# Patient Record
Sex: MALE | Race: WHITE | NOT HISPANIC OR LATINO | Employment: FULL TIME | URBAN - METROPOLITAN AREA
[De-identification: names, ages, dates, MRNs, and addresses within clinical notes are randomized per-mention and may not be internally consistent; named-entity substitution may affect disease eponyms.]

---

## 2022-08-01 ENCOUNTER — TRANSFERRED RECORDS (OUTPATIENT)
Dept: MULTI SPECIALTY CLINIC | Facility: CLINIC | Age: 62
End: 2022-08-01

## 2022-08-01 LAB
CHOLESTEROL (EXTERNAL): 216 MG/DL (ref 100–199)
HDLC SERPL-MCNC: 29 MG/DL
LDL CHOLESTEROL CALCULATED (EXTERNAL): 154 MG/DL
NON HDL CHOLESTEROL (EXTERNAL): 187 MG/DL
TRIGLYCERIDES (EXTERNAL): 167 MG/DL

## 2023-05-16 ENCOUNTER — OFFICE VISIT (OUTPATIENT)
Dept: FAMILY MEDICINE | Facility: OTHER | Age: 63
End: 2023-05-16
Attending: NURSE PRACTITIONER
Payer: COMMERCIAL

## 2023-05-16 VITALS
HEART RATE: 90 BPM | SYSTOLIC BLOOD PRESSURE: 136 MMHG | BODY MASS INDEX: 42.45 KG/M2 | DIASTOLIC BLOOD PRESSURE: 84 MMHG | TEMPERATURE: 98.9 F | OXYGEN SATURATION: 94 % | RESPIRATION RATE: 18 BRPM | WEIGHT: 280.1 LBS | HEIGHT: 68 IN

## 2023-05-16 DIAGNOSIS — J20.9 ACUTE BRONCHITIS, UNSPECIFIED ORGANISM: ICD-10-CM

## 2023-05-16 DIAGNOSIS — R05.8 PRODUCTIVE COUGH: Primary | ICD-10-CM

## 2023-05-16 PROCEDURE — 99203 OFFICE O/P NEW LOW 30 MIN: CPT | Performed by: NURSE PRACTITIONER

## 2023-05-16 RX ORDER — EPINEPHRINE 0.3 MG/.3ML
1 INJECTION SUBCUTANEOUS
COMMUNITY
Start: 2022-07-18

## 2023-05-16 ASSESSMENT — PAIN SCALES - GENERAL: PAINLEVEL: NO PAIN (0)

## 2023-05-16 NOTE — NURSING NOTE
"Chief Complaint   Patient presents with     Cough     Ongoing since last Friday 5/12/23, milky green mucous      chest congestion     Nasal Congestion         Initial /84 (BP Location: Left arm, Patient Position: Sitting, Cuff Size: Adult Regular)   Pulse 90   Temp 98.9  F (37.2  C) (Tympanic)   Resp 18   Ht 1.727 m (5' 8\")   Wt 127.1 kg (280 lb 1.6 oz)   SpO2 94%   BMI 42.59 kg/m   Estimated body mass index is 42.59 kg/m  as calculated from the following:    Height as of this encounter: 1.727 m (5' 8\").    Weight as of this encounter: 127.1 kg (280 lb 1.6 oz).       Medication Reconciliation: Complete    Eden Cohen LPN .......  5/16/2023  2:13 PM   "

## 2023-05-16 NOTE — PROGRESS NOTES
ASSESSMENT/PLAN:    I have reviewed the nursing notes.  I have reviewed the findings, diagnosis, plan and need for follow up with the patient.    1. Productive cough  2. Acute bronchitis, unspecified organism  Provided reassurance to been that his exam and history are consistent with acute bronchitis which is generally caused by viral infections and does not require antibiotic treatment today.  He is receptive to this.  I have very low concern for pneumonia and patient would like to forego chest x-ray at this time but is agreeable to returning for further evaluation if no signs of improvement or worsening condition over the next week.  I recommend he use over-the-counter Mucinex twice daily and increase water intake to help thin secretions.  Offered cough suppressing medicine but patient declines as he is sleeping okay despite nocturnal cough.  - Symptomatic treatment - Encouraged fluids, salt water gargles, honey (only if greater than 1 year in age due to risk of botulism), elevation, humidifier, sinus rinse/netti pot, lozenges, tea, topical vapor rub, popsicles, rest, etc   -May use over-the-counter Tylenol or ibuprofen PRN    Discussed warning signs/symptoms indicative of need to f/u    Follow up if symptoms persist or worsen or concerns    I explained my diagnostic considerations and recommendations to the patient, who voiced understanding and agreement with the treatment plan. All questions were answered. We discussed potential side effects of any prescribed or recommended therapies, as well as expectations for response to treatments.    Yasmin Amador NP  5/16/2023  2:29 PM    HPI:  Cruzito Jacobo is a 62 year old male  who presents to [facility] today for concerns of cough ongoing since last Friday 5/12/2023 with milky green mucous and chest and nasal congestion.  Started about 5 days ago and shows no signs of improvement but is not progressing to worse either.  He has not had any fevers.  Very slight  "shortness of breath with activity but no significant dyspnea.  His wife now is starting to get similar symptoms.  He has had 3 negative COVID test since onset of symptoms.  History of pneumonia 1 time many years ago and he does not feel similarly to how he did then to his recollection.  Does not have any sinus pain or congestion.  Non-smoker.  [place] usually gets primary care physicals in the [facility].  No known diabetes or cardiac history diagnosis.  He is coughing much more at night but it is not interrupting his ability to sleep.    ROS otherwise negative.     History reviewed. No pertinent past medical history.  History reviewed. No pertinent surgical history.  Social History     Tobacco Use     Smoking status: Former     Types: Cigarettes     Quit date: 2003     Years since quittin.0     Smokeless tobacco: Never   Vaping Use     Vaping status: Never Used   Substance Use Topics     Alcohol use: Not Currently     Current Outpatient Medications   Medication Sig Dispense Refill     EPINEPHrine (ANY BX GENERIC EQUIV) 0.3 MG/0.3ML injection 2-pack Inject 1 Pen into the muscle       Allergies   Allergen Reactions     Bee Venom Swelling     Past medical history, past surgical history, current medications and allergies reviewed and accurate to the best of my knowledge.      ROS:  Refer to HPI    /84 (BP Location: Left arm, Patient Position: Sitting, Cuff Size: Adult Regular)   Pulse 90   Temp 98.9  F (37.2  C) (Tympanic)   Resp 18   Ht 1.727 m (5' 8\")   Wt 127.1 kg (280 lb 1.6 oz)   SpO2 94%   BMI 42.59 kg/m      EXAM:  General Appearance: Well appearing 62 year old male, appropriate appearance for age. No acute distress   Ears: Left TM intact, translucent with bony landmarks appreciated, no erythema, no effusion, no bulging, no purulence.  Right TM intact, translucent with bony landmarks appreciated, no erythema, no effusion, no bulging, no " purulence.  Left auditory canal clear.  Right auditory canal clear.  Normal external ears, non tender.  Eyes: conjunctivae normal without erythema or irritation, corneas clear, no drainage or crusting, no eyelid swelling, pupils equal   Oropharynx: moist mucous membranes, posterior pharynx without erythema, tonsils symmetric, no erythema, no exudates or petechiae, no post nasal drip seen, no trismus, voice clear.    Sinuses:  No sinus tenderness upon palpation of the frontal or maxillary sinuses  Nose:  Bilateral nares: no erythema, no edema, no drainage or congestion   Neck: supple without adenopathy  Respiratory: normal chest wall and respirations.  Normal effort.  Clear to auscultation bilaterally, no wheezing, crackles or rhonchi.  No increased work of breathing.  + occasional congested cough appreciated.  Cardiac: RRR with no murmurs  Psychological: normal affect, alert, oriented, and pleasant.

## 2023-05-16 NOTE — PATIENT INSTRUCTIONS
Recommend mucinex and lots of water to help get the secretions out of you bronchials.     If worsening over 1 week, return or further evaluation with potential chest xr.

## 2024-06-15 ENCOUNTER — TELEPHONE (OUTPATIENT)
Dept: FAMILY MEDICINE | Facility: OTHER | Age: 64
End: 2024-06-15

## 2024-06-15 ENCOUNTER — VIRTUAL VISIT (OUTPATIENT)
Dept: FAMILY MEDICINE | Facility: OTHER | Age: 64
End: 2024-06-15
Attending: NURSE PRACTITIONER
Payer: COMMERCIAL

## 2024-06-15 DIAGNOSIS — U07.1 COVID-19 VIRUS INFECTION: Primary | ICD-10-CM

## 2024-06-15 PROCEDURE — 99441 PR PHYSICIAN TELEPHONE EVALUATION 5-10 MIN: CPT | Mod: 93 | Performed by: NURSE PRACTITIONER

## 2024-06-15 NOTE — TELEPHONE ENCOUNTER
Please resend rx of paxlovid to [facility].  Thank you. Patient requested at wife's advisement.    Kimberli Hinkle LPN LPN....................  6/15/2024   1:24 PM

## 2024-06-15 NOTE — PROGRESS NOTES
"Cruzito is a 63 year old who is being evaluated via a billable telephone visit.    What phone number would you like to be contacted at? [phone]  How would you like to obtain your AVS? Mail a copy  Originating Location (pt. Location): Home    Distant Location (provider location):  On-site    1. COVID-19 virus infection    - nirmatrelvir and ritonavir (PAXLOVID) 300 mg/100 mg therapy pack; Take 3 tablets by mouth 2 times daily for 5 days  Dispense: 30 tablet; Refill: 0    Medication interactions: none  Renal function: none available in chart     Start time:  11:20 am  End time: 11:28 am  Total time:  8 minutes     Sonya Sanabria NP on 6/15/2024 at 11:21 AM      Subjective   Cruzito is a 63 year old, presenting for the following health issues:  Covid Concern (antiviral)      HPI   Symptoms started yesterday and hit hard.  Positive home Covid testing this morning.  Exposure to covid from his spouse who just traveled to [facility] (tested positive on 6/12/24).  Hx of Covid once in 2022.    Denies any known chronic medical conditions.        COVID-19 Symptom Review  How many days ago did these symptoms start? 1 day    Are any of the following symptoms significant for you?  New or worsening difficulty breathing? No  Worsening cough? Yes, it's a dry cough.   Fever or chills? Yes, I felt feverish with fever of 101.5 with chills.  Headache: YES  Sore throat: YES  Chest pain: No  Diarrhea: No  Body aches: YES    What treatments has patient tried? Decongestant - oral   Does patient live in a nursing home, group home, or shelter? No  Does patient have a way to get food/medications during quarantined? Yes, I have a friend or family member who can help me.        Objective    Vitals - Patient Reported  Weight (Patient Reported): 122.5 kg (270 lb)  Height (Patient Reported): 167.6 cm (5' 6\")  BMI (Based on Pt Reported Ht/Wt): 43.58  Pain Score: Moderate Pain (4)  Pain Loc: Head        Physical Exam   General: Alert and no distress " //Respiratory: No audible wheeze, cough, or shortness of breath // Psychiatric:  Appropriate affect, tone, and pace of words

## 2024-09-05 PROBLEM — Z91.038 HYMENOPTERA ALLERGY: Status: ACTIVE | Noted: 2024-09-05

## 2024-09-05 PROBLEM — Z00.00 ANNUAL PHYSICAL EXAM: Status: ACTIVE | Noted: 2024-09-05

## 2024-09-05 PROBLEM — E78.49 FAMILIAL HYPERLIPIDEMIA: Status: ACTIVE | Noted: 2024-09-05

## 2024-09-10 ENCOUNTER — OFFICE VISIT (OUTPATIENT)
Dept: INTERNAL MEDICINE | Facility: OTHER | Age: 64
End: 2024-09-10
Attending: INTERNAL MEDICINE
Payer: COMMERCIAL

## 2024-09-10 VITALS
HEIGHT: 67 IN | SYSTOLIC BLOOD PRESSURE: 136 MMHG | TEMPERATURE: 97.4 F | HEART RATE: 68 BPM | DIASTOLIC BLOOD PRESSURE: 88 MMHG | RESPIRATION RATE: 18 BRPM | BODY MASS INDEX: 45.14 KG/M2 | OXYGEN SATURATION: 96 % | WEIGHT: 287.6 LBS

## 2024-09-10 DIAGNOSIS — Z71.85 VACCINE COUNSELING: ICD-10-CM

## 2024-09-10 DIAGNOSIS — E78.49 FAMILIAL HYPERLIPIDEMIA: ICD-10-CM

## 2024-09-10 DIAGNOSIS — Z00.00 ANNUAL PHYSICAL EXAM: Primary | ICD-10-CM

## 2024-09-10 DIAGNOSIS — E55.9 VITAMIN D DEFICIENCY: ICD-10-CM

## 2024-09-10 DIAGNOSIS — E66.01 CLASS 3 SEVERE OBESITY DUE TO EXCESS CALORIES WITH SERIOUS COMORBIDITY AND BODY MASS INDEX (BMI) OF 45.0 TO 49.9 IN ADULT (H): ICD-10-CM

## 2024-09-10 DIAGNOSIS — R06.02 EXERTIONAL SHORTNESS OF BREATH: ICD-10-CM

## 2024-09-10 DIAGNOSIS — E66.813 CLASS 3 SEVERE OBESITY DUE TO EXCESS CALORIES WITH SERIOUS COMORBIDITY AND BODY MASS INDEX (BMI) OF 45.0 TO 49.9 IN ADULT (H): ICD-10-CM

## 2024-09-10 DIAGNOSIS — I10 BENIGN ESSENTIAL HYPERTENSION: ICD-10-CM

## 2024-09-10 DIAGNOSIS — E53.8 VITAMIN B12 DEFICIENCY: ICD-10-CM

## 2024-09-10 DIAGNOSIS — Z91.038 HYMENOPTERA ALLERGY: ICD-10-CM

## 2024-09-10 DIAGNOSIS — Z12.5 ENCOUNTER FOR SCREENING FOR MALIGNANT NEOPLASM OF PROSTATE: ICD-10-CM

## 2024-09-10 LAB
ALBUMIN SERPL BCG-MCNC: 4.4 G/DL (ref 3.5–5.2)
ALP SERPL-CCNC: 76 U/L (ref 40–150)
ALT SERPL W P-5'-P-CCNC: 40 U/L (ref 0–70)
ANION GAP SERPL CALCULATED.3IONS-SCNC: 8 MMOL/L (ref 7–15)
AST SERPL W P-5'-P-CCNC: 32 U/L (ref 0–45)
BASOPHILS # BLD AUTO: 0 10E3/UL (ref 0–0.2)
BASOPHILS NFR BLD AUTO: 0 %
BILIRUB SERPL-MCNC: 0.3 MG/DL
BUN SERPL-MCNC: 14 MG/DL (ref 8–23)
CALCIUM SERPL-MCNC: 9.5 MG/DL (ref 8.8–10.4)
CHLORIDE SERPL-SCNC: 105 MMOL/L (ref 98–107)
CHOLEST SERPL-MCNC: 223 MG/DL
CREAT SERPL-MCNC: 0.96 MG/DL (ref 0.67–1.17)
EGFRCR SERPLBLD CKD-EPI 2021: 89 ML/MIN/1.73M2
EOSINOPHIL # BLD AUTO: 0.2 10E3/UL (ref 0–0.7)
EOSINOPHIL NFR BLD AUTO: 2 %
ERYTHROCYTE [DISTWIDTH] IN BLOOD BY AUTOMATED COUNT: 13.1 % (ref 10–15)
FASTING STATUS PATIENT QL REPORTED: NO
FASTING STATUS PATIENT QL REPORTED: NO
GLUCOSE SERPL-MCNC: 116 MG/DL (ref 70–99)
HCO3 SERPL-SCNC: 28 MMOL/L (ref 22–29)
HCT VFR BLD AUTO: 46.5 % (ref 40–53)
HDLC SERPL-MCNC: 28 MG/DL
HGB BLD-MCNC: 15.3 G/DL (ref 13.3–17.7)
HOLD SPECIMEN: NORMAL
IMM GRANULOCYTES # BLD: 0 10E3/UL
IMM GRANULOCYTES NFR BLD: 0 %
LDLC SERPL CALC-MCNC: 144 MG/DL
LYMPHOCYTES # BLD AUTO: 1.8 10E3/UL (ref 0.8–5.3)
LYMPHOCYTES NFR BLD AUTO: 17 %
MCH RBC QN AUTO: 30.1 PG (ref 26.5–33)
MCHC RBC AUTO-ENTMCNC: 32.9 G/DL (ref 31.5–36.5)
MCV RBC AUTO: 92 FL (ref 78–100)
MONOCYTES # BLD AUTO: 0.8 10E3/UL (ref 0–1.3)
MONOCYTES NFR BLD AUTO: 8 %
NEUTROPHILS # BLD AUTO: 7.5 10E3/UL (ref 1.6–8.3)
NEUTROPHILS NFR BLD AUTO: 73 %
NONHDLC SERPL-MCNC: 195 MG/DL
NRBC # BLD AUTO: 0 10E3/UL
NRBC BLD AUTO-RTO: 0 /100
PLATELET # BLD AUTO: 258 10E3/UL (ref 150–450)
POTASSIUM SERPL-SCNC: 4.5 MMOL/L (ref 3.4–5.3)
PROT SERPL-MCNC: 7.2 G/DL (ref 6.4–8.3)
PSA SERPL DL<=0.01 NG/ML-MCNC: 0.66 NG/ML (ref 0–4.5)
RBC # BLD AUTO: 5.08 10E6/UL (ref 4.4–5.9)
SODIUM SERPL-SCNC: 141 MMOL/L (ref 135–145)
TRIGL SERPL-MCNC: 253 MG/DL
VIT B12 SERPL-MCNC: 518 PG/ML (ref 232–1245)
VIT D+METAB SERPL-MCNC: 24 NG/ML (ref 20–50)
WBC # BLD AUTO: 10.3 10E3/UL (ref 4–11)

## 2024-09-10 PROCEDURE — 36415 COLL VENOUS BLD VENIPUNCTURE: CPT | Mod: ZL | Performed by: INTERNAL MEDICINE

## 2024-09-10 PROCEDURE — 99214 OFFICE O/P EST MOD 30 MIN: CPT | Mod: 25 | Performed by: INTERNAL MEDICINE

## 2024-09-10 PROCEDURE — 82306 VITAMIN D 25 HYDROXY: CPT | Mod: ZL | Performed by: INTERNAL MEDICINE

## 2024-09-10 PROCEDURE — 82040 ASSAY OF SERUM ALBUMIN: CPT | Mod: ZL | Performed by: INTERNAL MEDICINE

## 2024-09-10 PROCEDURE — 80061 LIPID PANEL: CPT | Mod: ZL | Performed by: INTERNAL MEDICINE

## 2024-09-10 PROCEDURE — 82607 VITAMIN B-12: CPT | Mod: ZL | Performed by: INTERNAL MEDICINE

## 2024-09-10 PROCEDURE — 99396 PREV VISIT EST AGE 40-64: CPT | Performed by: INTERNAL MEDICINE

## 2024-09-10 PROCEDURE — G0103 PSA SCREENING: HCPCS | Mod: ZL | Performed by: INTERNAL MEDICINE

## 2024-09-10 PROCEDURE — 85048 AUTOMATED LEUKOCYTE COUNT: CPT | Mod: ZL | Performed by: INTERNAL MEDICINE

## 2024-09-10 RX ORDER — ATORVASTATIN CALCIUM 40 MG/1
40 TABLET, FILM COATED ORAL DAILY
Qty: 90 TABLET | Refills: 4 | Status: SHIPPED | OUTPATIENT
Start: 2024-09-10

## 2024-09-10 ASSESSMENT — ENCOUNTER SYMPTOMS
DIARRHEA: 0
CONFUSION: 0
ARTHRALGIAS: 0
VOMITING: 0
DYSURIA: 0
WOUND: 0
AGITATION: 0
WHEEZING: 0
HEMATURIA: 0
CHILLS: 0
COUGH: 0
LIGHT-HEADEDNESS: 0
MYALGIAS: 0
FEVER: 0
SHORTNESS OF BREATH: 0
DIZZINESS: 0
BRUISES/BLEEDS EASILY: 0
ABDOMINAL PAIN: 0
NAUSEA: 0

## 2024-09-10 ASSESSMENT — PAIN SCALES - GENERAL: PAINLEVEL: NO PAIN (0)

## 2024-09-10 NOTE — PATIENT INSTRUCTIONS
Blood pressure was high. Recheck better.     Labs today.       Blood pressure checks at home - check some in AM, some in Afternoon, some in Evening and record   -- bring these with you to your next appointment.     Goal blood pressures -- less than 140 and less than 90.    -- Ideally would like the numbers about 110-130 and 70-80.  -- If running higher or lower than this on regular basis, will need to adjust your medications.        Start Lipitor for cholesterol.     Immunization History   Administered Date(s) Administered    COVID-19 12+ (MODERNA) 10/30/2023    COVID-19 Bivalent 12+ (Pfizer) 11/11/2022    COVID-19 MONOVALENT 12+ (Pfizer) 03/12/2021, 04/02/2021, 12/05/2021    Influenza Vaccine >6 months,quad, PF 10/14/2020    Influenza Vaccine, 6+MO IM (QUADRIVALENT W/PRESERVATIVES) 10/16/2017    Influenza,INJ,MDCK,PF,Quad >6mo(Flucelvax) 12/05/2021, 11/11/2022, 10/30/2023    TDAP (Adacel,Boostrix) 06/17/2015      Consider:  -- Annual Flu / Influenza vaccination - Every Fall (Starting about October 1st)    Consider:  -- COVID-19 Vaccination shot -- TWICE Yearly (Spring and Fall)    -- Tetanus shot updated - from one of the local pharmacies, at your convenience -- Check cost/coverage.     -- Shingles shot (2 in series) (Shingrix) - from one of the local pharmacies, at your convenience -- Check cost/coverage.     One Dose:  -- RSV vaccine for age 60+ with medical issues, and one time for all individuals age 75+ -- In the Fall (Starting about October 1st)  (If Medicare insurance - get vaccine at the Pharmacy.     Private insurance may be able to get in clinic with clinic shot nurse.)  --- Check Cost $$$        Return in approximately 1 year, or sooner as needed for follow-up with Dr. Anne.  - Annual Follow-up / Physical     Clinic : [phone]  Appointment line: [phone]         Your Body mass index (BMI) is:  Estimated body mass index is 45.04 kg/m  as calculated from the following:    Height as of  "this encounter: 1.702 m (5' 7\").    Weight as of this encounter: 130.5 kg (287 lb 9.6 oz).   (BMI ranges: Normal 18.5 - 25, Overweight 25 - 30, Obesity greater than 30)     Facts about losing weight:   -- Overweight and Obesity increase your risk for developing diabetes, high blood pressure and stroke, and shorten your life.   -- 90% of weight loss comes from dietary changes, only 10% from exercise   -- For every 1 pound of of weight loss -- this is equal to about 8 to 10 pounds of weight off of your knees.   -- there are about 3500 calories in 1 pound of body weight.     -- Goal Caloric intake -- 1500 to 1700 calories daily.     Get out and exercise, bike ride, walk for 10 to 15 minutes after each meal -- this can significantly lowers the spike in blood sugar after eating.     To help with weight loss and improve blood sugar control....    -- Try to avoid Carbohydrates as much as possible -- breads, pasta, baked goods, cakes, oatmeal, cold cereal, potatoes.   -- Eat more lean meats, proteins, eggs, nuts, vegetables.    -- Start or Continue regular daily exercise.      -- Eat more lean meats, proteins, eggs, nuts, vegetables.      What have successful people done to lose large amounts of weight, and keep it off?   -- Used both diet and exercise to lose weight   -- Ate a healthy breakfast every day   -- Exercised an hour per day   -- Watched less than 10 hours of TV per week   -- Weighed themselves weekly   -- Drink a large glass of water 10-15 minutes before your meals.   -- Use smaller dinner plates and don't go back for seconds.     What should I do?   -- Work on 5-10% weight loss   -- Focus on a few healthy dietary changes   -- Eat more fresh fruits and vegetables, and fewer carbohydrates (http://myplate.gov/)   -- Exercise by some means -- every day   -- Weigh yourself once a week    Weight Management   Weight Watchers   Meets Mondays 9:30, 11:45, or 5:30  Sabino Romo, [address], [place], [place] "   Contact Alva [phone] [email or web address]  Weight Watchers [email or web address]    -- Consider My Fitness Pal on your smart phone  [email or web address]

## 2024-09-10 NOTE — LETTER
September 10, 2024      Cruzito Jacobo  [address]  [place] [address]        Dear ,    We are writing to inform you of your test results.    Vitamin B12 levels are on the low end of normal.  Consider oral replacement.  TSH is normal.  CBC normal.  Cholesterols are high and anticoagulated glucose is elevated.  Liver enzymes are normal.  Creatinine 0.96 with GFR of 89.    Resulted Orders   Comprehensive metabolic panel   Result Value Ref Range    Sodium 141 135 - 145 mmol/L    Potassium 4.5 3.4 - 5.3 mmol/L    Carbon Dioxide (CO2) 28 22 - 29 mmol/L    Anion Gap 8 7 - 15 mmol/L    Urea Nitrogen 14.0 8.0 - 23.0 mg/dL    Creatinine 0.96 0.67 - 1.17 mg/dL    GFR Estimate 89 >60 mL/min/1.73m2      Comment:      eGFR calculated using 2021 CKD-EPI equation.    Calcium 9.5 8.8 - 10.4 mg/dL      Comment:      Reference intervals for this test were updated on 7/16/2024 to reflect our healthy population more accurately. There may be differences in the flagging of prior results with similar values performed with this method. Those prior results can be interpreted in the context of the updated reference intervals.    Chloride 105 98 - 107 mmol/L    Glucose 116 (H) 70 - 99 mg/dL    Alkaline Phosphatase 76 40 - 150 U/L    AST 32 0 - 45 U/L    ALT 40 0 - 70 U/L    Protein Total 7.2 6.4 - 8.3 g/dL    Albumin 4.4 3.5 - 5.2 g/dL    Bilirubin Total 0.3 <=1.2 mg/dL    Patient Fasting > 8hrs? No    Lipid Profile   Result Value Ref Range    Cholesterol 223 (H) <200 mg/dL    Triglycerides 253 (H) <150 mg/dL    Direct Measure HDL 28 (L) >=40 mg/dL    LDL Cholesterol Calculated 144 (H) <100 mg/dL    Non HDL Cholesterol 195 (H) <130 mg/dL    Patient Fasting > 8hrs? No     Narrative    Cholesterol  Desirable: < 200 mg/dL  Borderline High: 200 - 239 mg/dL  High: >= 240 mg/dL    Triglycerides  Normal: < 150 mg/dL  Borderline High: 150 - 199 mg/dL  High: 200-499 mg/dL  Very High: >= 500 mg/dL    Direct Measure HDL  Female: >=  50 mg/dL   Male: >= 40 mg/dL    LDL Cholesterol  Desirable: < 100 mg/dL  Above Desirable: 100 - 129 mg/dL   Borderline High: 130 - 159 mg/dL   High:  160 - 189 mg/dL   Very High: >= 190 mg/dL    Non HDL Cholesterol  Desirable: < 130 mg/dL  Above Desirable: 130 - 159 mg/dL  Borderline High: 160 - 189 mg/dL  High: 190 - 219 mg/dL  Very High: >= 220 mg/dL   Prostate Specific Antigen Screen   Result Value Ref Range    Prostate Specific Antigen Screen 0.66 0.00 - 4.50 ng/mL    Narrative    This result is obtained using the Roche Elecsys total PSA method on the jeffrey [id number] immunoassay analyzer. Results obtained with different assay methods or kits cannot be used interchangeably.   Vitamin B12   Result Value Ref Range    Vitamin B12 518 232 - 1,245 pg/mL   CBC with platelets and differential   Result Value Ref Range    WBC Count 10.3 4.0 - 11.0 10e3/uL    RBC Count 5.08 4.40 - 5.90 10e6/uL    Hemoglobin 15.3 13.3 - 17.7 g/dL    Hematocrit 46.5 40.0 - 53.0 %    MCV 92 78 - 100 fL    MCH 30.1 26.5 - 33.0 pg    MCHC 32.9 31.5 - 36.5 g/dL    RDW 13.1 10.0 - 15.0 %    Platelet Count 258 150 - 450 10e3/uL    % Neutrophils 73 %    % Lymphocytes 17 %    % Monocytes 8 %    % Eosinophils 2 %    % Basophils 0 %    % Immature Granulocytes 0 %    NRBCs per 100 WBC 0 <1 /100    Absolute Neutrophils 7.5 1.6 - 8.3 10e3/uL    Absolute Lymphocytes 1.8 0.8 - 5.3 10e3/uL    Absolute Monocytes 0.8 0.0 - 1.3 10e3/uL    Absolute Eosinophils 0.2 0.0 - 0.7 10e3/uL    Absolute Basophils 0.0 0.0 - 0.2 10e3/uL    Absolute Immature Granulocytes 0.0 <=0.4 10e3/uL    Absolute NRBCs 0.0 10e3/uL       If you have any questions or concerns, please call the clinic at the number listed above.       Sincerely,      Chiki Anne MD

## 2024-09-10 NOTE — NURSING NOTE
"Chief Complaint   Patient presents with    Physical       Initial BP (!) 162/104   Pulse 68   Temp 97.4  F (36.3  C)   Resp 18   Ht 1.702 m (5' 7\")   Wt 130.5 kg (287 lb 9.6 oz)   SpO2 96%   BMI 45.04 kg/m   Estimated body mass index is 45.04 kg/m  as calculated from the following:    Height as of this encounter: 1.702 m (5' 7\").    Weight as of this encounter: 130.5 kg (287 lb 9.6 oz).  Medication Review: complete    The next two questions are to help us understand your food security.  If you are feeling you need any assistance in this area, we have resources available to support you today.          9/10/2024   SDOH- Food Insecurity   Within the past 12 months, did you worry that your food would run out before you got money to buy more? N   Within the past 12 months, did the food you bought just not last and you didn t have money to get more? N            Health Care Directive:  Patient does not have a Health Care Directive or Living Will: Discussed advance care planning with patient; however, patient declined at this time.    Sharla Batres LPN      "

## 2024-10-25 ENCOUNTER — OFFICE VISIT (OUTPATIENT)
Dept: FAMILY MEDICINE | Facility: OTHER | Age: 64
End: 2024-10-25
Attending: NURSE PRACTITIONER
Payer: COMMERCIAL

## 2024-10-25 VITALS
TEMPERATURE: 98.7 F | RESPIRATION RATE: 19 BRPM | HEIGHT: 67 IN | OXYGEN SATURATION: 97 % | BODY MASS INDEX: 45.04 KG/M2 | HEART RATE: 72 BPM | SYSTOLIC BLOOD PRESSURE: 132 MMHG | DIASTOLIC BLOOD PRESSURE: 74 MMHG | WEIGHT: 287 LBS
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
## 2024-10-25 DIAGNOSIS — S50.861A TICK BITE OF RIGHT FOREARM, INITIAL ENCOUNTER: Primary | ICD-10-CM

## 2024-10-25 PROCEDURE — 99213 OFFICE O/P EST LOW 20 MIN: CPT | Performed by: NURSE PRACTITIONER

## 2024-10-25 RX ORDER — DOXYCYCLINE 100 MG/1
200 CAPSULE ORAL ONCE
Qty: 2 CAPSULE | Refills: 0 | Status: SHIPPED | OUTPATIENT
Start: 2024-10-25 | End: 2024-10-25

## 2024-10-25 ASSESSMENT — PAIN SCALES - GENERAL: PAINLEVEL_OUTOF10: NO PAIN (0)

## 2024-10-25 NOTE — NURSING NOTE
"Chief Complaint   Patient presents with    Tick Bite     Right forearm     Patient here for deer tick bite of right forearm x2 days. Notes redness and mild tenderness.       Initial /74 (BP Location: Left arm, Patient Position: Sitting, Cuff Size: Adult Regular)   Pulse 72   Temp 98.7  F (37.1  C) (Tympanic)   Resp 19   Ht 1.702 m (5' 7\")   Wt 130.2 kg (287 lb)   SpO2 97%   BMI 44.95 kg/m   Estimated body mass index is 44.95 kg/m  as calculated from the following:    Height as of this encounter: 1.702 m (5' 7\").    Weight as of this encounter: 130.2 kg (287 lb).  Medication Review: complete    The next two questions are to help us understand your food security.  If you are feeling you need any assistance in this area, we have resources available to support you today.          10/25/2024   SDOH- Food Insecurity   Within the past 12 months, did you worry that your food would run out before you got money to buy more? N   Within the past 12 months, did the food you bought just not last and you didn t have money to get more? N              Health Care Directive:  Patient does not have a Health Care Directive: Discussed advance care planning with patient; however, patient declined at this time.    Oralia Underwood LPN      "

## 2024-10-25 NOTE — PROGRESS NOTES
ASSESSMENT/PLAN:     I have reviewed the nursing notes.  I have reviewed the findings, diagnosis, plan and need for follow up with the patient.          1. Tick bite of right forearm, initial encounter (Primary)  - doxycycline hyclate (VIBRAMYCIN) 100 MG capsule; Take 2 capsules (200 mg) by mouth once for 1 dose.  Dispense: 2 capsule; Refill: 0    Doxycycline 200 mg one time for prophylactic dosing  No lab work needed today as it is too soon for accurate results   Instructed to wash tick bite with soap and water. Apply antibiotic ointment to site 1-2 times daily until healed.  Tick education discussed including signs/symptoms of tick illness  Follow up if development of any of these symptoms for further evaluation.      I explained my diagnostic considerations and recommendations to the patient, who voiced understanding and agreement with the treatment plan. All questions were answered. We discussed potential side effects of any prescribed or recommended therapies, as well as expectations for response to treatments.    Sonya Sanabria NP  [facility]      SUBJECTIVE:   Cruzito Jacobo is a 64 year old male who presents to clinic today for the following health issues:  Tick bite    HPI  Patient found an attached deer tick on his right forearm 2 days ago.  He estimates the tick to be attached for about a day.  He notes some redness and mild tenderness to the bite area.  Denies fevers, chills, joint pain, headaches, fatigue.  No known hx of tick illness.         History reviewed. No pertinent past medical history.  History reviewed. No pertinent surgical history.  Social History     Tobacco Use    Smoking status: Former     Current packs/day: 0.00     Types: Cigarettes     Quit date: 2003     Years since quittin.5    Smokeless tobacco: Never   Substance Use Topics    Alcohol use: Not Currently     Current Outpatient Medications   Medication Sig Dispense Refill    atorvastatin (LIPITOR) 40  "MG tablet Take 1 tablet (40 mg) by mouth daily. -- for cholesterol 90 tablet 4    EPINEPHrine (ANY BX GENERIC EQUIV) 0.3 MG/0.3ML injection 2-pack Inject 1 Pen into the muscle       Allergies   Allergen Reactions    Bee Venom Swelling         Past medical history, past surgical history, current medications and allergies reviewed and accurate to the best of my knowledge.        OBJECTIVE:     /74 (BP Location: Left arm, Patient Position: Sitting, Cuff Size: Adult Regular)   Pulse 72   Temp 98.7  F (37.1  C) (Tympanic)   Resp 19   Ht 1.702 m (5' 7\")   Wt 130.2 kg (287 lb)   SpO2 97%   BMI 44.95 kg/m    Body mass index is 44.95 kg/m .        Physical Exam  General Appearance: Well appearing adult male, appropriate appearance for age. No acute distress  Respiratory: normal chest wall and respirations.  Normal effort.  No cough appreciated.  Musculoskeletal:  Equal movement of bilateral upper extremities.  Equal movement of bilateral lower extremities.  Normal gait.    Dermatological: Right mid forearm with single small circular skin lesion identified and consistent with tick bite, no surrounding erythema  Psychological: normal affect, alert, oriented, and pleasant.         "

## 2025-01-28 ENCOUNTER — MYC MEDICAL ADVICE (OUTPATIENT)
Dept: INTERNAL MEDICINE | Facility: OTHER | Age: 65
End: 2025-01-28
Payer: COMMERCIAL

## 2025-01-28 DIAGNOSIS — M65.30 TRIGGER FINGER, ACQUIRED: Primary | ICD-10-CM

## 2025-01-28 NOTE — TELEPHONE ENCOUNTER
Message below came in as CRM Request:    ----- Message -----       From:Cruzito Jacobo       Sent:1/25/2025 11:57 AM CST         To:Customer Service    Subject:Referral     Topic: Non-Medical Question.    I think I think I need to see orthopedist for a case of trigger finger. Do I need  referral from my primary care physician?    Edward Linares RN on 1/28/2025 at 10:16 AM

## 2025-01-28 NOTE — TELEPHONE ENCOUNTER
"Pt requesting referral for \"Trigger Finger\" to see Ortho.     Nam'd up referral.     Routing to provider to review and respond.  Edward Linares RN on 1/28/2025 at 10:17 AM    "

## 2025-02-17 ENCOUNTER — OFFICE VISIT (OUTPATIENT)
Dept: FAMILY MEDICINE | Facility: OTHER | Age: 65
End: 2025-02-17
Attending: INTERNAL MEDICINE
Payer: COMMERCIAL

## 2025-02-17 VITALS
DIASTOLIC BLOOD PRESSURE: 80 MMHG | TEMPERATURE: 97.3 F | OXYGEN SATURATION: 97 % | BODY MASS INDEX: 43.95 KG/M2 | HEART RATE: 82 BPM | HEIGHT: 67 IN | WEIGHT: 280 LBS | SYSTOLIC BLOOD PRESSURE: 138 MMHG | RESPIRATION RATE: 18 BRPM

## 2025-02-17 DIAGNOSIS — M65.342 ACQUIRED TRIGGER FINGER OF LEFT RING FINGER: Primary | ICD-10-CM

## 2025-02-17 ASSESSMENT — PAIN SCALES - GENERAL: PAINLEVEL_OUTOF10: MILD PAIN (3)

## 2025-02-17 NOTE — NURSING NOTE
"Chief Complaint   Patient presents with    Finger     Trigger finger left hand ring finger       Initial /80   Pulse 82   Temp 97.3  F (36.3  C) (Temporal)   Resp 18   Ht 1.708 m (5' 7.25\")   Wt 127 kg (280 lb)   SpO2 97%   BMI 43.53 kg/m   Estimated body mass index is 43.53 kg/m  as calculated from the following:    Height as of this encounter: 1.708 m (5' 7.25\").    Weight as of this encounter: 127 kg (280 lb).  Medication Review: complete    The next two questions are to help us understand your food security.  If you are feeling you need any assistance in this area, we have resources available to support you today.          10/25/2024   SDOH- Food Insecurity   Within the past 12 months, did you worry that your food would run out before you got money to buy more? N   Within the past 12 months, did the food you bought just not last and you didn t have money to get more? N         Health Care Directive:  Patient does not have a Health Care Directive: Discussed advance care planning with patient; however, patient declined at this time.    Marina Hodges LPN      "

## 2025-02-17 NOTE — PROGRESS NOTES
"Sports Medicine Office Note    HPI:  64-year-old male coming in for evaluation of left fourth finger triggering.  His symptoms have been going on for approximately 1 month.  No inciting event or injury.  He feels that he had similar symptoms 4-5 years ago involving the bilateral fourth digits.  This was treated with an injection and resolved the symptoms.  His current pain is 3/10.  He characterized the pain as dull and aching.  He has difficulty bending the digit.  He does have associated locking.  Occasionally he will have to use his other hand to release the digit from a flexed position.      EXAM:  /80   Pulse 82   Temp 97.3  F (36.3  C) (Temporal)   Resp 18   Ht 1.708 m (5' 7.25\")   Wt 127 kg (280 lb)   SpO2 97%   BMI 43.53 kg/m    MUSCULOSKELETAL EXAM:  LEFT HAND  Inspection:  -No gross deformity  -No bruising or swelling  -Scars:  None    Tenderness to palpation of the:  -Ulnar styloid:  Negative  -Distal radius:  Negative  -Rylan's tubercle:  Negative  -Scapholunate ligament:  Negative  -Scaphoid in anatomical snuffbox:  Negative  -1st CMC joint:  Negative  -1st MCP joint:  Negative  -Metacarpals: Mild pain over the volar aspect of the fourth MCP joint    Range of Motion:  -Able to fully extend fingers  -Able to make full fist    Strength:  - strength:  5/5    Sensation:  -Intact to light touch in the radial, median, and ulnar nerve distributions    Motor:  -Intact AIN, PIN, and IO    Special Tests:  - Evaluation for triggering: Positive    Other:  -No signs of cyanosis. Normal skin temperature of the upper extremity.  -Elbow:  No gross deformity. Full range of motion.  -Right hand:  No gross deformity. No palpable tenderness. Normal strength and ROM.      IMAGING:  None      ASSESSMENT/PLAN:  Diagnoses and all orders for this visit:  Acquired trigger finger of left ring finger  -     Orthopedic  Referral    64-year-old male with left fourth trigger finger.  No indications for " imaging.  Treatment options were reviewed and include bracing, rest, activity modification, oral analgesics, injections, hand therapy, and surgery.  With his previous symptoms and response with an injection, the patient would like to proceed with this intervention.  - Follow-up in the office for an ultrasound-guided injection into the flexor tendon sheath at the A1 pulley of the left fourth digit      Gerry Clark MD  2/17/2025  8:50 AM    Total time spent with this patient was 23 minutes which included chart review, visualization and independent interpretation of images, time spent with the patient, and documentation.    Procedure time:  0 minute(s)

## 2025-03-03 ENCOUNTER — OFFICE VISIT (OUTPATIENT)
Dept: FAMILY MEDICINE | Facility: OTHER | Age: 65
End: 2025-03-03
Attending: FAMILY MEDICINE
Payer: COMMERCIAL

## 2025-03-03 VITALS
SYSTOLIC BLOOD PRESSURE: 121 MMHG | HEIGHT: 69 IN | TEMPERATURE: 97.6 F | DIASTOLIC BLOOD PRESSURE: 81 MMHG | BODY MASS INDEX: 40.43 KG/M2 | RESPIRATION RATE: 12 BRPM | HEART RATE: 64 BPM | OXYGEN SATURATION: 95 % | WEIGHT: 273 LBS

## 2025-03-03 DIAGNOSIS — M65.342 ACQUIRED TRIGGER FINGER OF LEFT RING FINGER: Primary | ICD-10-CM

## 2025-03-03 PROCEDURE — 250N000009 HC RX 250: Performed by: FAMILY MEDICINE

## 2025-03-03 PROCEDURE — 250N000011 HC RX IP 250 OP 636: Mod: JW | Performed by: FAMILY MEDICINE

## 2025-03-03 RX ORDER — LIDOCAINE HYDROCHLORIDE 10 MG/ML
0.5 INJECTION, SOLUTION INFILTRATION; PERINEURAL ONCE
Status: COMPLETED | OUTPATIENT
Start: 2025-03-03 | End: 2025-03-03

## 2025-03-03 RX ORDER — DEXAMETHASONE SODIUM PHOSPHATE 10 MG/ML
5 INJECTION, SOLUTION INTRAMUSCULAR; INTRAVENOUS ONCE
Status: COMPLETED | OUTPATIENT
Start: 2025-03-03 | End: 2025-03-03

## 2025-03-03 RX ADMIN — LIDOCAINE HYDROCHLORIDE 0.5 ML: 10 INJECTION, SOLUTION INFILTRATION; PERINEURAL at 08:31

## 2025-03-03 RX ADMIN — DEXAMETHASONE SODIUM PHOSPHATE 5 MG: 10 INJECTION, SOLUTION INTRAMUSCULAR; INTRAVENOUS at 08:30

## 2025-03-03 ASSESSMENT — PAIN SCALES - GENERAL: PAINLEVEL_OUTOF10: MILD PAIN (2)

## 2025-03-03 NOTE — PROGRESS NOTES
"Sports Medicine Office Note    HPI:  64-year-old male coming in for follow-up evaluation of left fourth finger triggering.  His symptoms have been going on for over a month.  No inciting event or injury.  He had similar symptoms 4-5 years ago in the bilateral fourth digits that were treated with an injection.  He was seen in this office on 2/17.  He comes in today for an ultrasound-guided injection into the flexor tendon sheath at the A1 pulley site of the fourth digit.  His current pain is 3/10.      EXAM:  /81 (BP Location: Right arm, Patient Position: Sitting, Cuff Size: Adult Large)   Pulse 64   Temp 97.6  F (36.4  C) (Temporal)   Resp 12   Ht 1.74 m (5' 8.5\")   Wt 123.8 kg (273 lb)   SpO2 95%   BMI 40.91 kg/m    Musculoskeletal exam: Left hand  - No gross deformity  - No bruising or soft tissue swelling  - Normal skin temperature without cyanosis      IMAGING:  None      ASSESSMENT/PLAN:  Diagnoses and all orders for this visit:  Acquired trigger finger of left ring finger  -     POC US SOFT TISSUE  -     US Guided Needle Placement  -     INJECTION SINGLE TENDON SHEATH/LIGAMENT  -     lidocaine 1 % injection 0.5 mL  -     lidocaine 1 % injection 0.5 mL  -     dexAMETHasone PF (DECADRON) injection 5 mg    64-year-old male who with left fourth trigger finger.  After reviewing the risks/benefits, the patient elects to proceed with an ultrasound-guided injection into the left fourth flexor tendon sheath at the A1 pulley site.  See procedure note below:    PROCEDURE:  Ultrasound Guided Injection of the Left 4th Flexor Tendon Sheath      EQUIPMENT:  Roberto Affiniti 70 with Linear L15-7io (7-15MHz) Transducer (Hockey Stick).      PROCEDURAL PAUSE:    A procedural pause was conducted to verify:  correct patient identity, procedure to be performed, and as applicable, correct side, site, and correct patient position.      INFORMED CONSENT:   I discussed the risks, possible benefits, and alternatives to " injection.  Following denial of allergy and review of potential side effects and complications (including but not necessarily limited to infection, allergic reaction, fat necrosis, skin depigmentation, local tissue breakdown, systemic effects of corticosteroids, elevation of blood glucose, injury to soft tissue and/or nerves, and seizure), all questions were answered, and consent was given to proceed.  Patient verbalized understanding.      PROCEDURE DETAILS:    The use of direct ultrasound visualization of the needle (rather than a non-guided ultrasound procedure) was required to increase patient safety by excluding inadvertent intramuscular or intratendinous placement and minimizing bleeding and injury by avoiding osteochondral and nearby neurovascular structures.  Guidance also maximizes accurate injection placement and likely clinical benefit beyond that obtained with a non-guided injection.  This allows increased diagnostic specificity when evaluating effectiveness of the injection.      Prior to the procedure, the left 4th digit was examined with a 15MHz linear hockey stick transducer to determine the optimal needle path and visualize the 4th flexor tendon and A1 pulley.  Following this, the skin was marked and the left 4th digit and palm were prepped with chlorhexidine.  Local anesthesia was obtained with 0.5mL of 1% lidocaine.  Then this area was re-examined using the same transducer, a sterile ultrasound transducer cover, sterile gloves, and sterile gel.  Under ultrasound guidance, a 1.5-inch 25-gauge needle was advanced from distal to proximal using and in-plane approach at the level of the A1 pulley and superficial to the tendon.  One needle pass was performed.  After ultrasound visualization of the needle tip in the target area and negative aspiration for blood, a mixture of 0.5mL of 1% lidocaine and 0.5mL of dexamethasone (10 mg/ml) was injected into the left 4th flexor tendon sheath at the A1 pulley  site.  0mL was wasted.  Images have been saved on the musculoskeletal ultrasound in clinic.      The patient tolerated the procedure without complication and was discharged in good condition after a short observation period.  The patient was instructed to contact me regarding any questions pertaining to the procedure.      DIAGNOSIS:    -Successful ultrasound guided injection of the left 4th flexor tendon sheath without immediate complication      PLAN:   -Post-procedure care reviewed, including avoiding submersion of the injection site for 48 hours   -Return precautions reviewed for signs/symptoms that would be concerning for infection   -The patient was instructed to ice and take APAP this evening if needed   -Return to clinic as needed      Gerry Clark MD  3/3/2025  8:19 AM    Total time spent with this patient was 36 minutes which included chart review, visualization and independent interpretation of images, time spent with the patient, and documentation.    Procedure time:  27 minute(s)

## 2025-04-15 ENCOUNTER — MYC MEDICAL ADVICE (OUTPATIENT)
Dept: FAMILY MEDICINE | Facility: OTHER | Age: 65
End: 2025-04-15
Payer: COMMERCIAL

## 2025-04-15 DIAGNOSIS — M65.342 ACQUIRED TRIGGER FINGER OF LEFT RING FINGER: Primary | ICD-10-CM

## 2025-05-19 ENCOUNTER — OFFICE VISIT (OUTPATIENT)
Dept: ORTHOPEDICS | Facility: OTHER | Age: 65
End: 2025-05-19
Attending: FAMILY MEDICINE
Payer: COMMERCIAL

## 2025-05-19 VITALS — WEIGHT: 272 LBS | OXYGEN SATURATION: 98 % | HEART RATE: 83 BPM | BODY MASS INDEX: 40.76 KG/M2

## 2025-05-19 DIAGNOSIS — M65.342 ACQUIRED TRIGGER FINGER OF LEFT RING FINGER: ICD-10-CM

## 2025-05-19 ASSESSMENT — PAIN SCALES - GENERAL: PAINLEVEL_OUTOF10: MILD PAIN (2)

## 2025-05-19 NOTE — PROGRESS NOTES
Subjective:    64-year-old gentleman with left ring trigger finger for a few months.  He had an injection in the left ring finger that did nothing for him.  He states that he just wants to get back to playing guitar at this point.  He also notes that he has the same pain in his long finger as well on the left.  At this point he has difficulty even bending the finger or straightening it out all the way secondary to the trigger on the ring finger and likely a mild amount of quadregia.    Objective:    On examination today he is lacking about 5 degrees of extension at the PIP and DIP joints of his left ring finger.  This is limiting some of the range of motion of the long finger as well.  He is neurovascularly intact.  He is tender to palpation at the DIP and PIP joints as well as the A1 pulley.  He has difficulty localizing the pain however because he said his entire finger really hurts.  He has some scarring at the tip of the long finger.    Assessment:    64-year-old gentleman with trigger finger of the left long and ring fingers.      Plan:    He would like to proceed forward with surgical intervention for this as this is likely not going to get better without surgical intervention.  All the risks and benefits of surgical invention were discussed with him and he would like to proceed.  Will see him back the time of surgery.

## 2025-06-20 ENCOUNTER — RESULTS FOLLOW-UP (OUTPATIENT)
Dept: FAMILY MEDICINE | Facility: OTHER | Age: 65
End: 2025-06-20

## 2025-06-20 NOTE — H&P (VIEW-ONLY)
Preoperative Evaluation  [facility]  [address]  [place] [address]  Phone: [phone]  Fax: [phone]  Primary Provider: Chiki Anne MD  Pre-op Performing Provider: BLANCHE FERNÁNDEZ CNP  Jun 20, 2025 6/19/2025   Surgical Information   What procedure is being done? Trigger finger release   Facility or Hospital where procedure/surgery will be performed: [facility]   Who is doing the procedure / surgery? Gwen   Date of surgery / procedure: 7/14/25   Time of surgery / procedure: 845   Where do you plan to recover after surgery? at home with family     Fax number for surgical facility: Note does not need to be faxed, will be available electronically in Epic.    Assessment & Plan     The proposed surgical procedure is considered INTERMEDIATE risk.    Problem List Items Addressed This Visit       Familial hyperlipidemia -  in 2017    Benign essential hypertension     Other Visit Diagnoses         Preop general physical exam    -  Primary    Relevant Orders    CBC and Differential (Completed)    Basic Metabolic Panel (Completed)    EKG 12-lead, tracing only (Same Day) (Completed)      Acquired trigger finger of left ring finger              Patient presents for preoperative physical exam, he will be undergoing trigger finger release.     He notes he has had history of pre hypertension/hypertension, treated with lifestyle, no medications. History of hyperlipidemia on statin. Denies CP, dyspnea, or palpitations.  BP today at 136/78. EKG NSR. Labs stable.       - No identified additional risk factors other than previously addressed    Preoperative Medication Instructions  Antiplatelet or Anticoagulation Medication Instructions   - We reviewed the medication list and the patient is not on an antiplatelet or anticoagulation medications.    Additional Medication Instructions  Take all scheduled medications on the day of surgery EXCEPT for modifications  listed below:   - Herbal medications and vitamins: DO NOT TAKE 14 days prior to surgery.    Recommendation  Approval given to proceed with proposed procedure, without further diagnostic evaluation.    Subjective   Cruzito is a 65 year old, presenting for the following:  Pre-Op Exam          6/20/2025     8:49 AM   Additional Questions   Roomed by GARY Hoffman   Accompanied by Self     HPI: Patient with history of acquired trigger finger, here today for preoperative exam for trigger finger release scheduled on 7/14/25.           6/19/2025   Pre-Op Questionnaire   Have you ever had a heart attack or stroke? No   Have you ever had surgery on your heart or blood vessels, such as a stent placement, a coronary artery bypass, or surgery on an artery in your head, neck, heart, or legs? No   Do you have chest pain with activity? No   Do you have a history of heart failure? No   Do you currently have a cold, bronchitis or symptoms of other infection? No   Do you have a cough, shortness of breath, or wheezing? No   Do you or anyone in your family have previous history of blood clots? No   Do you or does anyone in your family have a serious bleeding problem such as prolonged bleeding following surgeries or cuts? No   Have you ever had problems with anemia or been told to take iron pills? No   Have you had any abnormal blood loss such as black, tarry or bloody stools? No   Have you ever had a blood transfusion? No   Are you willing to have a blood transfusion if it is medically needed before, during, or after your surgery? Yes   Have you or any of your relatives ever had problems with anesthesia? No   Do you have sleep apnea, excessive snoring or daytime drowsiness? No   Do you have any artifical heart valves or other implanted medical devices like a pacemaker, defibrillator, or continuous glucose monitor? No   Do you have artificial joints? No   Are you allergic to latex? No     Advance Care Planning    Patient states has Health  Care Directive and will send to [employer].    Preoperative Review of    reviewed - no record of controlled substances prescribed.      Status of Chronic Conditions:  HYPERTENSION - Patient has longstanding history of HTN , currently denies any symptoms referable to elevated blood pressure. Specifically denies chest pain, palpitations, dyspnea, orthopnea, PND or peripheral edema. Blood pressure readings have been in normal range. Current medication regimen is as listed below. Patient denies any side effects of medication.     Patient Active Problem List    Diagnosis Date Noted    Benign essential hypertension 09/10/2024     Priority: Medium    Class 3 severe obesity due to excess calories with serious comorbidity and body mass index (BMI) of 45.0 to 49.9 in adult (H) 09/10/2024     Priority: Medium    Hymenoptera allergy 2024     Priority: Medium    Familial hyperlipidemia -  in 2017 2024     Priority: Medium      History reviewed. No pertinent past medical history.  History reviewed. No pertinent surgical history.  Current Outpatient Medications   Medication Sig Dispense Refill    atorvastatin (LIPITOR) 40 MG tablet Take 1 tablet (40 mg) by mouth daily. -- for cholesterol 90 tablet 4    EPINEPHrine (ANY BX GENERIC EQUIV) 0.3 MG/0.3ML injection 2-pack Inject 1 Pen into the muscle (Patient not taking: Reported on 2025)         Allergies   Allergen Reactions    Bee Venom Swelling        Social History     Tobacco Use    Smoking status: Former     Current packs/day: 0.00     Types: Cigarettes     Quit date: 2003     Years since quittin.1     Passive exposure: Never    Smokeless tobacco: Never   Substance Use Topics    Alcohol use: Not Currently     Family History   Problem Relation Age of Onset    Anemia Mother     Heart Failure Father      History   Drug Use Unknown             Review of Systems  Constitutional, neuro, ENT, endocrine, pulmonary, cardiac, gastrointestinal,  "genitourinary, musculoskeletal, integument and psychiatric systems are negative, except as otherwise noted.    Objective    /78   Pulse 75   Temp 96.8  F (36  C) (Tympanic)   Resp 16   Ht 1.74 m (5' 8.5\")   Wt 124.4 kg (274 lb 3.2 oz)   SpO2 96%   BMI 41.09 kg/m     Estimated body mass index is 41.09 kg/m  as calculated from the following:    Height as of this encounter: 1.74 m (5' 8.5\").    Weight as of this encounter: 124.4 kg (274 lb 3.2 oz).  Physical Exam  GENERAL: alert and no distress  EYES: Eyes grossly normal to inspection, PERRL and conjunctivae and sclerae normal  HENT: ear canals and TM's normal, nose and mouth without ulcers or lesions  NECK: no adenopathy, no asymmetry, masses, or scars  RESP: lungs clear to auscultation - no rales, rhonchi or wheezes  CV: regular rate and rhythm, normal S1 S2, no S3 or S4, no murmur, click or rub, no peripheral edema  ABDOMEN: soft, nontender, no hepatosplenomegaly, no masses and bowel sounds normal  MS: no gross musculoskeletal defects noted, no edema  SKIN: no suspicious lesions or rashes  NEURO: Normal strength and tone, mentation intact and speech normal  PSYCH: mentation appears normal, affect normal/bright    Recent Labs   Lab Test 09/10/24  1004   HGB 15.3         POTASSIUM 4.5   CR 0.96        Diagnostics  Recent Results (from the past 24 hours)   EKG 12-lead, tracing only (Same Day)    Collection Time: 06/20/25  9:02 AM   Result Value Ref Range    Systolic Blood Pressure  mmHg    Diastolic Blood Pressure  mmHg    Ventricular Rate 68 BPM    Atrial Rate 68 BPM    MA Interval 196 ms    QRS Duration 100 ms     ms    QTc 425 ms    P Axis 28 degrees    R AXIS 2 degrees    T Axis 10 degrees    Interpretation ECG       Sinus rhythm  Normal ECG  No previous ECGs available     Basic Metabolic Panel    Collection Time: 06/20/25  9:21 AM   Result Value Ref Range    Sodium 140 135 - 145 mmol/L    Potassium 4.2 3.4 - 5.3 mmol/L    Chloride " 104 98 - 107 mmol/L    Carbon Dioxide (CO2) 26 22 - 29 mmol/L    Anion Gap 10 7 - 15 mmol/L    Urea Nitrogen 14.2 8.0 - 23.0 mg/dL    Creatinine 0.95 0.67 - 1.17 mg/dL    GFR Estimate 89 >60 mL/min/1.73m2    Calcium 9.5 8.8 - 10.4 mg/dL    Glucose 96 70 - 99 mg/dL   CBC with platelets and differential    Collection Time: 06/20/25  9:21 AM   Result Value Ref Range    WBC Count 9.5 4.0 - 11.0 10e3/uL    RBC Count 5.05 4.40 - 5.90 10e6/uL    Hemoglobin 14.9 13.3 - 17.7 g/dL    Hematocrit 44.7 40.0 - 53.0 %    MCV 89 78 - 100 fL    MCH 29.5 26.5 - 33.0 pg    MCHC 33.3 31.5 - 36.5 g/dL    RDW 13.3 10.0 - 15.0 %    Platelet Count 256 150 - 450 10e3/uL    % Neutrophils 63 %    % Lymphocytes 25 %    % Monocytes 9 %    % Eosinophils 2 %    % Basophils 1 %    % Immature Granulocytes 0 %    NRBCs per 100 WBC 0 <1 /100    Absolute Neutrophils 6.0 1.6 - 8.3 10e3/uL    Absolute Lymphocytes 2.4 0.8 - 5.3 10e3/uL    Absolute Monocytes 0.8 0.0 - 1.3 10e3/uL    Absolute Eosinophils 0.2 0.0 - 0.7 10e3/uL    Absolute Basophils 0.1 0.0 - 0.2 10e3/uL    Absolute Immature Granulocytes 0.0 <=0.4 10e3/uL    Absolute NRBCs 0.0 10e3/uL      EKG: appears normal, NSR, normal axis, normal intervals, no acute ST/T changes c/w ischemia, no LVH by voltage criteria, unchanged from previous tracings    Revised Cardiac Risk Index (RCRI)  The patient has the following serious cardiovascular risks for perioperative complications:   - No serious cardiac risks = 0 points     RCRI Interpretation: 0 points: Class I (very low risk - 0.4% complication rate)     Signed Electronically by: BLANCHE FERNÁNDEZ CNP  A copy of this evaluation report is provided to the requesting physician.         Answers submitted by the patient for this visit:  Lipid Visit (Submitted on 6/19/2025)  Chief Complaint: Chronic problems general questions HPI Form  Are you regularly taking any medication or supplement to lower your cholesterol?: Yes  Are you having muscle aches or  other side effects that you think could be caused by your cholesterol lowering medication?: No  Questionnaire about: Chronic problems general questions HPI Form (Submitted on 6/19/2025)  Chief Complaint: Chronic problems general questions HPI Form

## 2025-07-11 ENCOUNTER — ANESTHESIA EVENT (OUTPATIENT)
Dept: SURGERY | Facility: OTHER | Age: 65
End: 2025-07-11
Payer: COMMERCIAL

## 2025-07-11 RX ORDER — DEXAMETHASONE SODIUM PHOSPHATE 4 MG/ML
4 INJECTION, SOLUTION INTRA-ARTICULAR; INTRALESIONAL; INTRAMUSCULAR; INTRAVENOUS; SOFT TISSUE
Status: CANCELLED | OUTPATIENT
Start: 2025-07-11

## 2025-07-11 RX ORDER — OXYCODONE HYDROCHLORIDE 5 MG/1
10 TABLET ORAL
Refills: 0 | Status: CANCELLED | OUTPATIENT
Start: 2025-07-11

## 2025-07-11 RX ORDER — OXYCODONE HYDROCHLORIDE 5 MG/1
5 TABLET ORAL
Refills: 0 | Status: CANCELLED | OUTPATIENT
Start: 2025-07-11

## 2025-07-11 RX ORDER — NALOXONE HYDROCHLORIDE 0.4 MG/ML
0.1 INJECTION, SOLUTION INTRAMUSCULAR; INTRAVENOUS; SUBCUTANEOUS
Status: CANCELLED | OUTPATIENT
Start: 2025-07-11

## 2025-07-11 RX ORDER — ONDANSETRON 4 MG/1
4 TABLET, ORALLY DISINTEGRATING ORAL EVERY 30 MIN PRN
Status: CANCELLED | OUTPATIENT
Start: 2025-07-11

## 2025-07-11 RX ORDER — ONDANSETRON 2 MG/ML
4 INJECTION INTRAMUSCULAR; INTRAVENOUS EVERY 30 MIN PRN
Status: CANCELLED | OUTPATIENT
Start: 2025-07-11

## 2025-07-14 ENCOUNTER — HOSPITAL ENCOUNTER (OUTPATIENT)
Facility: OTHER | Age: 65
Discharge: HOME | End: 2025-07-14
Attending: ORTHOPAEDIC SURGERY | Admitting: ORTHOPAEDIC SURGERY
Payer: COMMERCIAL

## 2025-07-14 ENCOUNTER — ANESTHESIA (OUTPATIENT)
Dept: SURGERY | Facility: OTHER | Age: 65
End: 2025-07-14
Payer: COMMERCIAL

## 2025-07-14 VITALS
SYSTOLIC BLOOD PRESSURE: 109 MMHG | OXYGEN SATURATION: 97 % | WEIGHT: 272 LBS | BODY MASS INDEX: 40.29 KG/M2 | RESPIRATION RATE: 16 BRPM | HEART RATE: 65 BPM | TEMPERATURE: 97.1 F | HEIGHT: 69 IN | DIASTOLIC BLOOD PRESSURE: 60 MMHG

## 2025-07-14 DIAGNOSIS — M65.332 TRIGGER MIDDLE FINGER OF LEFT HAND: Primary | ICD-10-CM

## 2025-07-14 PROCEDURE — 272N000001 HC OR GENERAL SUPPLY STERILE: Performed by: ORTHOPAEDIC SURGERY

## 2025-07-14 PROCEDURE — 250N000009 HC RX 250: Performed by: ORTHOPAEDIC SURGERY

## 2025-07-14 PROCEDURE — 250N000009 HC RX 250: Performed by: NURSE ANESTHETIST, CERTIFIED REGISTERED

## 2025-07-14 PROCEDURE — 250N000011 HC RX IP 250 OP 636: Performed by: ORTHOPAEDIC SURGERY

## 2025-07-14 PROCEDURE — 258N000003 HC RX IP 258 OP 636

## 2025-07-14 PROCEDURE — 250N000011 HC RX IP 250 OP 636: Performed by: NURSE ANESTHETIST, CERTIFIED REGISTERED

## 2025-07-14 PROCEDURE — 26055 INCISE FINGER TENDON SHEATH: CPT | Performed by: NURSE ANESTHETIST, CERTIFIED REGISTERED

## 2025-07-14 PROCEDURE — 999N000141 HC STATISTIC PRE-PROCEDURE NURSING ASSESSMENT: Performed by: ORTHOPAEDIC SURGERY

## 2025-07-14 PROCEDURE — 370N000017 HC ANESTHESIA TECHNICAL FEE, PER MIN: Performed by: ORTHOPAEDIC SURGERY

## 2025-07-14 PROCEDURE — 26055 INCISE FINGER TENDON SHEATH: CPT | Mod: F2 | Performed by: ORTHOPAEDIC SURGERY

## 2025-07-14 PROCEDURE — 360N000074 HC SURGERY LEVEL 1, PER MIN: Performed by: ORTHOPAEDIC SURGERY

## 2025-07-14 PROCEDURE — 710N000012 HC RECOVERY PHASE 2, PER MINUTE: Performed by: ORTHOPAEDIC SURGERY

## 2025-07-14 RX ORDER — SODIUM CHLORIDE, SODIUM LACTATE, POTASSIUM CHLORIDE, CALCIUM CHLORIDE 600; 310; 30; 20 MG/100ML; MG/100ML; MG/100ML; MG/100ML
INJECTION, SOLUTION INTRAVENOUS CONTINUOUS
Status: DISCONTINUED | OUTPATIENT
Start: 2025-07-14 | End: 2025-07-14 | Stop reason: HOSPADM

## 2025-07-14 RX ORDER — BUPIVACAINE HYDROCHLORIDE 2.5 MG/ML
INJECTION, SOLUTION INFILTRATION; PERINEURAL PRN
Status: DISCONTINUED | OUTPATIENT
Start: 2025-07-14 | End: 2025-07-14 | Stop reason: HOSPADM

## 2025-07-14 RX ORDER — TRAMADOL HYDROCHLORIDE 50 MG/1
50 TABLET ORAL EVERY 6 HOURS PRN
Qty: 10 TABLET | Refills: 0 | Status: SHIPPED | OUTPATIENT
Start: 2025-07-14 | End: 2025-07-17

## 2025-07-14 RX ORDER — LIDOCAINE HYDROCHLORIDE 20 MG/ML
INJECTION, SOLUTION INFILTRATION; PERINEURAL PRN
Status: DISCONTINUED | OUTPATIENT
Start: 2025-07-14 | End: 2025-07-14

## 2025-07-14 RX ORDER — MAGNESIUM HYDROXIDE 1200 MG/15ML
LIQUID ORAL PRN
Status: DISCONTINUED | OUTPATIENT
Start: 2025-07-14 | End: 2025-07-14 | Stop reason: HOSPADM

## 2025-07-14 RX ORDER — IBUPROFEN 200 MG
600 TABLET ORAL
Status: CANCELLED | OUTPATIENT
Start: 2025-07-14

## 2025-07-14 RX ORDER — IBUPROFEN 600 MG/1
600 TABLET, FILM COATED ORAL EVERY 6 HOURS PRN
Qty: 30 TABLET | Refills: 0 | Status: SHIPPED | OUTPATIENT
Start: 2025-07-14

## 2025-07-14 RX ORDER — LIDOCAINE 40 MG/G
CREAM TOPICAL
Status: DISCONTINUED | OUTPATIENT
Start: 2025-07-14 | End: 2025-07-14 | Stop reason: HOSPADM

## 2025-07-14 RX ORDER — PROPOFOL 10 MG/ML
INJECTION, EMULSION INTRAVENOUS PRN
Status: DISCONTINUED | OUTPATIENT
Start: 2025-07-14 | End: 2025-07-14

## 2025-07-14 RX ORDER — PROPOFOL 10 MG/ML
INJECTION, EMULSION INTRAVENOUS CONTINUOUS PRN
Status: DISCONTINUED | OUTPATIENT
Start: 2025-07-14 | End: 2025-07-14

## 2025-07-14 RX ADMIN — LIDOCAINE HYDROCHLORIDE 40 MG: 20 INJECTION, SOLUTION INFILTRATION; PERINEURAL at 08:20

## 2025-07-14 RX ADMIN — PROPOFOL 80 MG: 10 INJECTION, EMULSION INTRAVENOUS at 08:20

## 2025-07-14 RX ADMIN — SODIUM CHLORIDE, SODIUM LACTATE, POTASSIUM CHLORIDE, AND CALCIUM CHLORIDE: .6; .31; .03; .02 INJECTION, SOLUTION INTRAVENOUS at 07:29

## 2025-07-14 RX ADMIN — PROPOFOL 140 MCG/KG/MIN: 10 INJECTION, EMULSION INTRAVENOUS at 08:20

## 2025-07-14 ASSESSMENT — ACTIVITIES OF DAILY LIVING (ADL)
ADLS_ACUITY_SCORE: 35

## 2025-07-14 ASSESSMENT — LIFESTYLE VARIABLES: TOBACCO_USE: 1

## 2025-07-14 NOTE — OP NOTE
Preop Dx: Left long and ring trigger fingers    Postop Dx: Left long and ring trigger fingers    Procedure: Left long and ring trigger finger releases    Surgeon: Julian Raman MD    Assistant: My assistant MELITON Clark/HUNTER was necessary for the expedient and safe performance of the procedure.  They participated in all aspects of the case from patient positioning to application of a sterile dressing.    Anesthesia: Local with MAC    Blood Loss: Minimal    Complications: None    Indications: Patient is a 65-year-old gentleman with symptomatic trigger fingers of the left hand involving both the long and ring fingers.  He can longer tolerate these hand in this fashion and having exhausted all conservative measures for treatment he wished to proceed with trigger finger releases.  All the risks and benefits surgical intervention were discussed with him.    Description: Patient taken to the operating room and after adequate induction anesthesia was positioned, prepped and draped in the usual sterile fashion on the operative table.  Universal protocol timeout was initiated and once all in the room were in agreement  the hand was exsanguinated and the tourniquet raised to 250 mmHg.  An incision was made overlying the A1 pulley of the long and ring fingers of the left hand.  Blunt dissection was made down to the tendon sheath and the tendon sheath was incised in line with the limb.  Once both A1 pulleys have been released and good excursion of the tendons was noted, the wounds were copiously irrigated and closed with 4-0 nylon in a interrupted horizontal mattress suture fashion.  The tourniquet was let down and the wounds were dressed with a sterile dressing.  He will follow-up in 2 weeks for removal of his sutures.

## 2025-07-14 NOTE — OR NURSING
Patient has been discharged to home at 0941 via ambulation to car accompanied by Rajni    Written discharge instructions were provided to patient.  Prescriptions were escribed.  Patient states their pain is 01/10, and denies any nausea or dizziness upon discharge.    Patient and adult caring for them verbalize understanding of discharge instructions including no driving until tomorrow and no longer taking narcotic pain medications - no operating mechanical equipment and no making any important decisions.They understand reason for discharge, and necessary follow-up appointments.

## 2025-07-14 NOTE — ANESTHESIA PREPROCEDURE EVALUATION
Anesthesia Pre-Procedure Evaluation    Patient: Cruzito Jacobo   MRN: [id number] : 1960          Procedure : Procedure(s):  Left Long and Ring Fingers, Trigger Release         History reviewed. No pertinent past medical history.   History reviewed. No pertinent surgical history.   Allergies   Allergen Reactions    Bee Venom Swelling      Social History     Tobacco Use    Smoking status: Former     Current packs/day: 0.00     Types: Cigarettes     Quit date: 2003     Years since quittin.2     Passive exposure: Never    Smokeless tobacco: Never   Substance Use Topics    Alcohol use: Not Currently      Wt Readings from Last 1 Encounters:   25 123.4 kg (272 lb)        Anesthesia Evaluation   Pt has not had prior anesthetic         ROS/MED HX  ENT/Pulmonary:     (+)                tobacco use, Past use,                       Neurologic:       Cardiovascular:     (+) Dyslipidemia hypertension- -   -  - -                                      METS/Exercise Tolerance: >4 METS    Hematologic:       Musculoskeletal:       GI/Hepatic:       Renal/Genitourinary:       Endo:     (+)               Obesity,       Psychiatric/Substance Use:       Infectious Disease:       Malignancy:       Other:              Physical Exam  Airway  Mallampati: II  TM distance: >3 FB  Neck ROM: full  Upper bite lip test: II  Mouth opening: >= 4 cm    Cardiovascular - normal exam   Dental   (+) Minor Abnormalities - some fillings, tiny chips      Pulmonary - normal exam      Neurological - normal exam  He appears awake, alert and oriented x3.    Other Findings       OUTSIDE LABS:  CBC:   Lab Results   Component Value Date    WBC 9.5 2025    WBC 10.3 09/10/2024    HGB 14.9 2025    HGB 15.3 09/10/2024    HCT 44.7 2025    HCT 46.5 09/10/2024     2025     09/10/2024     BMP:   Lab Results   Component Value Date     2025     09/10/2024    POTASSIUM 4.2 2025    POTASSIUM 4.5  "09/10/2024    CHLORIDE 104 06/20/2025    CHLORIDE 105 09/10/2024    CO2 26 06/20/2025    CO2 28 09/10/2024    BUN 14.2 06/20/2025    BUN 14.0 09/10/2024    CR 0.95 06/20/2025    CR 0.96 09/10/2024    GLC 96 06/20/2025     (H) 09/10/2024     COAGS: No results found for: \"PTT\", \"INR\", \"FIBR\"  POC: No results found for: \"BGM\", \"HCG\", \"HCGS\"  HEPATIC:   Lab Results   Component Value Date    ALBUMIN 4.4 09/10/2024    PROTTOTAL 7.2 09/10/2024    ALT 40 09/10/2024    AST 32 09/10/2024    ALKPHOS 76 09/10/2024    BILITOTAL 0.3 09/10/2024     OTHER:   Lab Results   Component Value Date    MARCELINO 9.5 06/20/2025       Anesthesia Plan    ASA Status:  3      NPO Status: NPO Appropriate   Anesthesia Type: MAC.  Airway: natural airway.   Techniques and Equipment:       - Monitoring Plan: standard ASA monitoring     Consents    Anesthesia Plan(s) and associated risks, benefits, and realistic alternatives discussed. Questions answered and patient/representative(s) expressed understanding.     - Discussed: CRNA     - Discussed with:  Patient               Postoperative Care         Comments:                   BLANCHE Bradley CRNA    I have reviewed the pertinent notes and labs in the chart from the past 30 days and (re)examined the patient.  Any updates or changes from those notes are reflected in this note.    Clinically Significant Risk Factors Present on Admission                   # Hypertension: Noted on problem list           # Morbid Obesity: Estimated body mass index is 40.76 kg/m  as calculated from the following:    Height as of this encounter: 1.74 m (5' 8.5\").    Weight as of this encounter: 123.4 kg (272 lb).                    "

## 2025-07-14 NOTE — DISCHARGE INSTRUCTIONS
[facility]  Adult Discharge Orders & Instructions    ________________________________________________________________          For 12 hours after surgery  Get plenty of rest.  A responsible adult must stay with you for at least 12 hours after you leave the hospital.   You may feel lightheaded.  IF so, sit for a few minutes before standing.  Have someone help you get up.   You may have a slight fever. Call the doctor if your fever is over 101 F (38.3 C) (taken under the tongue) or lasts longer than 24 hours.  You may have a dry mouth, a sore throat, muscle aches or trouble sleeping.  These should go away after 24 hours.  Do not make important or legal decisions.  6.   Do not drive or use heavy equipment.  If you have weakness or tingling, don't drive or use heavy equipment until this feeling goes away.    To contact a doctor, call   [phone]_______________________

## 2025-07-14 NOTE — INTERVAL H&P NOTE
"I have reviewed the surgical (or preoperative) H&P that is linked to this encounter, and examined the patient. There are no significant changes    Clinical Conditions Present on Arrival:  Clinically Significant Risk Factors Present on Admission                       # Morbid Obesity: Estimated body mass index is 41.09 kg/m  as calculated from the following:    Height as of 6/20/25: 1.74 m (5' 8.5\").    Weight as of 6/20/25: 124.4 kg (274 lb 3.2 oz).       "

## 2025-07-14 NOTE — BRIEF OP NOTE
[facility]    Brief Operative Note    Pre-operative diagnosis: Trigger finger [M65.30]  Post-operative diagnosis Same as pre-operative diagnosis    Procedure: Left Long and Ring Fingers, Trigger Release, Left - Wrist    Surgeon: Surgeons and Role:     * Jose Francisco Raman MD - Primary     * Addi Osorio PA - Assisting  Anesthesia: MAC with Local   Estimated Blood Loss: Minimal    Drains: None  Specimens: * No specimens in log *  Findings:   None.  Complications: None.  Implants: * No implants in log *

## 2025-07-14 NOTE — ANESTHESIA POSTPROCEDURE EVALUATION
Patient: Cruzito Jacobo    Procedure: Procedure(s):  Left Long and Ring Fingers, Trigger Release       Anesthesia Type:  MAC    Note:  Disposition: Outpatient   Postop Pain Control: Uneventful            Sign Out: Well controlled pain   PONV: No   Neuro/Psych: Uneventful            Sign Out: Acceptable/Baseline neuro status   Airway/Respiratory: Uneventful            Sign Out: Acceptable/Baseline resp. status   CV/Hemodynamics: Uneventful            Sign Out: Acceptable CV status; No obvious hypovolemia; No obvious fluid overload   Other NRE: NONE   DID A NON-ROUTINE EVENT OCCUR?            Last vitals:  Vitals Value Taken Time   /60 07/14/25 09:15   Temp     Pulse 65 07/14/25 09:15   Resp     SpO2 96 % 07/14/25 09:16   Vitals shown include unfiled device data.    Electronically Signed By: BLANCHE NULL CRNA  July 14, 2025  9:23 AM

## 2025-07-14 NOTE — ANESTHESIA CARE TRANSFER NOTE
Patient: Cruzito Jacobo    Procedure: Procedure(s):  Left Long and Ring Fingers, Trigger Release       Diagnosis: Trigger finger [M65.30]  Diagnosis Additional Information: No value filed.    Anesthesia Type:   MAC     Note:    Oropharynx: spontaneously breathing  Level of Consciousness: awake  Oxygen Supplementation: room air    Independent Airway: airway patency satisfactory and stable    Vital Signs Stable: post-procedure vital signs reviewed and stable  Report to RN Given: handoff report given  Patient transferred to: Phase II    Handoff Report: Identifed the Patient, Identified the Reponsible Provider, Reviewed the pertinent medical history, Discussed the surgical course, Reviewed Intra-OP anesthesia mangement and issues during anesthesia, Set expectations for post-procedure period and Allowed opportunity for questions and acknowledgement of understanding      Vitals:  Vitals Value Taken Time   BP     Temp     Pulse     Resp     SpO2         Electronically Signed By: BLANCHE NULL [facility]  July 14, 2025  8:53 AM

## 2025-07-28 ENCOUNTER — OFFICE VISIT (OUTPATIENT)
Dept: ORTHOPEDICS | Facility: OTHER | Age: 65
End: 2025-07-28
Payer: COMMERCIAL

## 2025-07-28 DIAGNOSIS — Z98.890 S/P TRIGGER FINGER RELEASE: Primary | ICD-10-CM

## 2025-07-28 PROCEDURE — 99024 POSTOP FOLLOW-UP VISIT: CPT

## 2025-07-28 NOTE — PROGRESS NOTES
SUBJECTIVE:  Cruzito is a 65-year-old male now 2 weeks status post a left long and ring trigger finger release.  Today, he reports having no pain and denies any numbness or tingling of the left hand.  He reports his range of motion has been slowly improving and he is nearly able to form a full fist.  He denies any locking or catching when flexing or extending the fingers. He notes swelling of the long and ring fingers will wax and wane throughout the day depending on use.  He has had no concerns for the surgical incisions and denies any incidence of fevers, chills or sweats.    OBJECTIVE:  65-year-old male alert and oriented in no acute distress.  The surgical incisions appear to be healing adequately without evidence of infection such as erythema, warmth or purulent drainage.  He is just short of being able to form a full fist.  He otherwise has full range of motion of the wrist and fingers.  He is neurovascularly intact.    ASSESSMENT/PLAN:  1. S/P trigger finger release (Primary)  65-year-old male now 2 weeks status post a left long and ring trigger finger release, and doing well.  We discussed at length today the signs and symptoms of infection and restrictions to include not submerging the left hand and to begin slowly returning to his normal activities as tolerated.  We discussed if the hand at any point begins to feel weak or stiff we may send him for some occupational therapy.  We will plan to follow-up in 4 weeks.

## 2025-08-25 ENCOUNTER — OFFICE VISIT (OUTPATIENT)
Dept: ORTHOPEDICS | Facility: OTHER | Age: 65
End: 2025-08-25
Payer: COMMERCIAL

## 2025-08-25 DIAGNOSIS — Z98.890 S/P TRIGGER FINGER RELEASE: Primary | ICD-10-CM

## 2025-08-25 PROCEDURE — 99024 POSTOP FOLLOW-UP VISIT: CPT

## (undated) DEVICE — BLADE KNIFE SURG 15 371115

## (undated) DEVICE — CAST PADDING 2" COTTON WEBRIL UNSTERILE 9082

## (undated) DEVICE — TOURNIQUET SGL BLADDER 18"X4" RED 5921-218-135

## (undated) DEVICE — GLOVE PROTEXIS PI ORTHO PF 9.0 2D73HT90

## (undated) DEVICE — PACK MAJOR EXTREMITY SOP15MEFCA

## (undated) DEVICE — SU ETHILON 4-0 FS-2 18" 662H

## (undated) DEVICE — GLOVE BIOGEL PI SZ 8.5 40885

## (undated) DEVICE — DRSG GAUZE 4X4" 3033

## (undated) DEVICE — SOL WATER 1500ML

## (undated) DEVICE — BNDG ELASTIC 2"X5YDS UNSTERILE 6611-20

## (undated) DEVICE — DRSG XEROFORM 1X8"

## (undated) DEVICE — ESU GROUND PAD ADULT W/CORD E7507

## (undated) DEVICE — DRAPE STERI TOWEL LG 1010

## (undated) DEVICE — PREP CHLORAPREP 26ML TINTED ORANGE  260815

## (undated) RX ORDER — BUPIVACAINE HYDROCHLORIDE 2.5 MG/ML
INJECTION, SOLUTION EPIDURAL; INFILTRATION; INTRACAUDAL; PERINEURAL
Status: DISPENSED
Start: 2025-07-14

## (undated) RX ORDER — FENTANYL CITRATE 50 UG/ML
INJECTION, SOLUTION INTRAMUSCULAR; INTRAVENOUS
Status: DISPENSED
Start: 2025-07-14

## (undated) RX ORDER — PROPOFOL 10 MG/ML
INJECTION, EMULSION INTRAVENOUS
Status: DISPENSED
Start: 2025-07-14

## (undated) RX ORDER — DEXAMETHASONE SODIUM PHOSPHATE 10 MG/ML
INJECTION, SOLUTION INTRAMUSCULAR; INTRAVENOUS
Status: DISPENSED
Start: 2025-03-03

## (undated) RX ORDER — LIDOCAINE HYDROCHLORIDE 10 MG/ML
INJECTION, SOLUTION INFILTRATION; PERINEURAL
Status: DISPENSED
Start: 2025-03-03